# Patient Record
Sex: FEMALE | URBAN - NONMETROPOLITAN AREA
[De-identification: names, ages, dates, MRNs, and addresses within clinical notes are randomized per-mention and may not be internally consistent; named-entity substitution may affect disease eponyms.]

---

## 2024-01-05 ENCOUNTER — LAB OUTSIDE AN ENCOUNTER (OUTPATIENT)
Dept: URBAN - NONMETROPOLITAN AREA CLINIC 13 | Facility: CLINIC | Age: 79
End: 2024-01-05

## 2024-01-05 ENCOUNTER — OFFICE VISIT (OUTPATIENT)
Dept: URBAN - NONMETROPOLITAN AREA CLINIC 13 | Facility: CLINIC | Age: 79
End: 2024-01-05
Payer: MEDICARE

## 2024-01-05 VITALS
SYSTOLIC BLOOD PRESSURE: 159 MMHG | BODY MASS INDEX: 21.71 KG/M2 | HEART RATE: 66 BPM | WEIGHT: 115 LBS | HEIGHT: 61 IN | DIASTOLIC BLOOD PRESSURE: 84 MMHG

## 2024-01-05 DIAGNOSIS — Z80.0 FAMILY HISTORY OF PANCREATIC CANCER: ICD-10-CM

## 2024-01-05 DIAGNOSIS — R10.84 GENERALIZED ABDOMINAL PAIN: ICD-10-CM

## 2024-01-05 PROCEDURE — 99204 OFFICE O/P NEW MOD 45 MIN: CPT

## 2024-01-05 NOTE — HPI-TODAY'S VISIT:
1/5/24 Ms. Anthony presents for evaluation of family history of pancreatic cancer. She has a sister recently diagnosed with pancreatic cancer and a father. who passed with pancreatic cancer. She denies any weight loss, nausea or vomiting. She has no heartburn or reflux.  Her previous colonoscopy was at age 60 stated she had no recommendations to  repeat, no family history of colon cancer. She experiences abdominal pain at times food related but rarely. If she avoids lactose she has less symptoms. S Today we discussed imaging for her history and abdominal pain as well as recommendations for referral to [facility] [place] to discuss genetic testing with the genetic counselor. SP

## 2024-01-08 ENCOUNTER — TELEPHONE ENCOUNTER (OUTPATIENT)
Dept: URBAN - NONMETROPOLITAN AREA CLINIC 2 | Facility: CLINIC | Age: 79
End: 2024-01-08

## 2024-01-16 PROBLEM — 102614006: Status: ACTIVE | Noted: 2024-01-05

## 2024-01-16 PROBLEM — 429000004: Status: ACTIVE | Noted: 2024-01-05

## 2024-04-12 ENCOUNTER — OV CON (OUTPATIENT)
Dept: URBAN - NONMETROPOLITAN AREA CLINIC 13 | Facility: CLINIC | Age: 79
End: 2024-04-12

## 2024-04-12 VITALS
HEIGHT: 61 IN | SYSTOLIC BLOOD PRESSURE: 129 MMHG | WEIGHT: 115 LBS | BODY MASS INDEX: 21.71 KG/M2 | DIASTOLIC BLOOD PRESSURE: 81 MMHG | HEART RATE: 87 BPM

## 2024-04-12 RX ORDER — IRBESARTAN 75 MG/1
TABLET ORAL
Qty: 30 TABLET | Status: ACTIVE | COMMUNITY

## 2024-04-12 NOTE — HPI-TODAY'S VISIT:
Ms. Anthony presents for evaluation of family history of pancreatic cancer. She has a sister recently diagnosed with pancreatic cancer and a father. who passed with pancreatic cancer. She denies any weight loss, nausea or vomiting. She has no heartburn or reflux.  Her previous colonoscopy was at age 60 stated she had no recommendations to  repeat, no family history of colon cancer. She experiences abdominal pain at times food related but rarely. If she avoids lactose she has less symptoms. S Today we discussed imaging for her history and abdominal pain as well as recommendations for referral to [facility] to discuss genetic testing with the genetic counselor. SP  4/12/2024  Sister with weight loss, then jaundice, then elevated A1C Don't borrow trouble